# Patient Record
Sex: FEMALE | Race: OTHER | NOT HISPANIC OR LATINO | URBAN - METROPOLITAN AREA
[De-identification: names, ages, dates, MRNs, and addresses within clinical notes are randomized per-mention and may not be internally consistent; named-entity substitution may affect disease eponyms.]

---

## 2017-03-06 ENCOUNTER — EMERGENCY (EMERGENCY)
Facility: HOSPITAL | Age: 47
LOS: 1 days | Discharge: HOME | End: 2017-03-06
Attending: EMERGENCY MEDICINE | Admitting: EMERGENCY MEDICINE
Payer: SELF-PAY

## 2017-03-06 VITALS
RESPIRATION RATE: 18 BRPM | TEMPERATURE: 98 F | HEIGHT: 63 IN | OXYGEN SATURATION: 98 % | WEIGHT: 180.78 LBS | SYSTOLIC BLOOD PRESSURE: 120 MMHG | DIASTOLIC BLOOD PRESSURE: 79 MMHG | HEART RATE: 91 BPM

## 2017-03-06 DIAGNOSIS — H92.02 OTALGIA, LEFT EAR: ICD-10-CM

## 2017-03-06 DIAGNOSIS — H72.92 UNSPECIFIED PERFORATION OF TYMPANIC MEMBRANE, LEFT EAR: Chronic | ICD-10-CM

## 2017-03-06 DIAGNOSIS — T70.0XXA OTITIC BAROTRAUMA, INITIAL ENCOUNTER: ICD-10-CM

## 2017-03-06 DIAGNOSIS — H72.92 UNSPECIFIED PERFORATION OF TYMPANIC MEMBRANE, LEFT EAR: ICD-10-CM

## 2017-03-06 PROCEDURE — 99283 EMERGENCY DEPT VISIT LOW MDM: CPT

## 2017-03-06 RX ORDER — IBUPROFEN 200 MG
1 TABLET ORAL
Qty: 20 | Refills: 0 | OUTPATIENT
Start: 2017-03-06

## 2017-03-06 RX ORDER — IBUPROFEN 200 MG
600 TABLET ORAL ONCE
Qty: 0 | Refills: 0 | Status: COMPLETED | OUTPATIENT
Start: 2017-03-06 | End: 2017-03-06

## 2017-03-06 RX ADMIN — Medication 600 MILLIGRAM(S): at 21:40

## 2017-03-06 RX ADMIN — Medication 1 TABLET(S): at 21:40

## 2017-03-06 NOTE — ED ADULT NURSE NOTE - CHIEF COMPLAINT QUOTE
c/o L. ear pain today after a long flight from [facility]. Pt. has had surgery of the same ear in the past. +hearing loss, +discharge from ear.

## 2017-03-06 NOTE — ED PROVIDER NOTE - MEDICAL DECISION MAKING DETAILS
Pt A&Ox3. NAD> Afebrile, nontoxic. Sitting comfortably. Rx ABX, NSAIDs and ENT F/U in 24 hours for further management.

## 2017-03-06 NOTE — ED ADULT TRIAGE NOTE - CHIEF COMPLAINT QUOTE
c/o L. ear pain today after a long flight from [facility]. Pt. has had surgery of the same ear in the past. +hearing loss, +discharge from ear.

## 2017-03-06 NOTE — ED PROVIDER NOTE - OBJECTIVE STATEMENT
46 y/o F with PMH of left TM perforation in 2007 presents c/o constant, nonspecific left ear pain with hearing loss and whitish discharge that began yesterday after [place] No aggravating/alleviating factors. She also reports mild, nonspecific headache, dizziness and nausea. States sx's are similar to previous TM perforation for which she had unspecified surgery in [place], [place] however does not recall the details.    Denies fever, chills, vision changes, bloody otic discharge, tinnitus, nasal congestion or epistaxis, sore throat or cough

## 2017-03-06 NOTE — ED PROVIDER NOTE - ATTENDING CONTRIBUTION TO CARE
spontaneous left TM rupture while on descent on flight from [facility] to [place] with drainage and muffled hearing.  exam reveals moderate tm perforation with eac patent. No exudate or eac erythema. otic ossicles visible.   Will rx augmentin, referral to ent, use ear plugs while showering/swimming..

## 2018-11-24 NOTE — ED PROVIDER NOTE - CONDUCTED A DETAILED DISCUSSION WITH PATIENT AND/OR GUARDIAN REGARDING, MDM
Message   Recorded as Task   Date: 06/06/2017 12:08 PM, Created By: BLESSING GREENBERG   Task Name: Call Patient with results   Assigned To: BLESSING GREENBERG   Regarding Patient: VIVIAN BARTH, Status: Active   Comment:    BLESSING GREENBERG - 06 Jun 2017 12:08 PM     Patient Phone: [phone]      Call - culture grew a contaminant, that can sometimes happen (fungus from environment contaminates the specimen), would recommend continuing terbinafine for full 12 weeks, remind needs repeat labs after 6 and 12 weeks of treatment.   Cantu,Bianca - 06 Jun 2017 3:52 PM     TASK EDITED  Results discussed with wife. Verbalized understanding. Will get repeat labs on 6/20 (6 weeks into tx).        Signatures   Electronically signed by : Bianca Cantu, ; Jun 6 2017  3:58PM CST     need for outpatient follow-up/return to ED if symptoms worsen, persist or questions arise

## 2019-08-13 NOTE — ED ADULT TRIAGE NOTE - NS ED NURSE DIRECT TO ROOM YN
Pt stated she haven't received her wheelchair and no one has called her. Informed pt I will inform Dr. Dominguez staff. Pt verbalizes understanding.   Yes